# Patient Record
Sex: FEMALE | Race: WHITE | NOT HISPANIC OR LATINO | Employment: OTHER | URBAN - METROPOLITAN AREA
[De-identification: names, ages, dates, MRNs, and addresses within clinical notes are randomized per-mention and may not be internally consistent; named-entity substitution may affect disease eponyms.]

---

## 2021-02-02 ENCOUNTER — OFFICE VISIT (OUTPATIENT)
Dept: FAMILY MEDICINE | Facility: CLINIC | Age: 66
End: 2021-02-02

## 2021-02-02 VITALS
SYSTOLIC BLOOD PRESSURE: 116 MMHG | BODY MASS INDEX: 21.62 KG/M2 | DIASTOLIC BLOOD PRESSURE: 90 MMHG | OXYGEN SATURATION: 99 % | HEART RATE: 70 BPM | HEIGHT: 68 IN | WEIGHT: 142.63 LBS | TEMPERATURE: 98 F | RESPIRATION RATE: 16 BRPM

## 2021-02-02 DIAGNOSIS — R09.82 POST-NASAL DRIP: ICD-10-CM

## 2021-02-02 DIAGNOSIS — Z76.89 ENCOUNTER TO ESTABLISH CARE WITH NEW DOCTOR: Primary | ICD-10-CM

## 2021-02-02 DIAGNOSIS — Z12.31 ENCOUNTER FOR SCREENING MAMMOGRAM FOR BREAST CANCER: ICD-10-CM

## 2021-02-02 PROCEDURE — 99203 OFFICE O/P NEW LOW 30 MIN: CPT | Mod: S$PBB,,, | Performed by: FAMILY MEDICINE

## 2021-02-02 PROCEDURE — 99203 PR OFFICE/OUTPT VISIT, NEW, LEVL III, 30-44 MIN: ICD-10-PCS | Mod: S$PBB,,, | Performed by: FAMILY MEDICINE

## 2021-02-02 PROCEDURE — 99204 OFFICE O/P NEW MOD 45 MIN: CPT | Mod: PBBFAC,PO | Performed by: FAMILY MEDICINE

## 2021-02-02 PROCEDURE — 99999 PR PBB SHADOW E&M-NEW PATIENT-LVL IV: ICD-10-PCS | Mod: PBBFAC,,, | Performed by: FAMILY MEDICINE

## 2021-02-02 PROCEDURE — 99999 PR PBB SHADOW E&M-NEW PATIENT-LVL IV: CPT | Mod: PBBFAC,,, | Performed by: FAMILY MEDICINE

## 2021-02-02 RX ORDER — METHYLPREDNISOLONE 4 MG/1
TABLET ORAL
Qty: 1 PACKAGE | Refills: 0 | OUTPATIENT
Start: 2021-02-02 | End: 2022-04-19

## 2021-02-11 DIAGNOSIS — Z11.59 NEED FOR HEPATITIS C SCREENING TEST: ICD-10-CM

## 2022-04-19 ENCOUNTER — HOSPITAL ENCOUNTER (EMERGENCY)
Facility: HOSPITAL | Age: 67
Discharge: HOME | End: 2022-04-19
Attending: EMERGENCY MEDICINE

## 2022-04-19 VITALS
HEART RATE: 63 BPM | OXYGEN SATURATION: 98 % | HEIGHT: 68 IN | BODY MASS INDEX: 20.16 KG/M2 | SYSTOLIC BLOOD PRESSURE: 130 MMHG | DIASTOLIC BLOOD PRESSURE: 69 MMHG | TEMPERATURE: 98 F | WEIGHT: 133 LBS | RESPIRATION RATE: 18 BRPM

## 2022-04-19 DIAGNOSIS — M25.552 LEFT HIP PAIN: ICD-10-CM

## 2022-04-19 DIAGNOSIS — M54.32 LEFT SIDED SCIATICA: Primary | ICD-10-CM

## 2022-04-19 PROCEDURE — 99283 EMERGENCY DEPT VISIT LOW MDM: CPT

## 2022-04-19 PROCEDURE — 63600175 PHARM REV CODE 636 W HCPCS: Performed by: NURSE PRACTITIONER

## 2022-04-19 PROCEDURE — 25000003 PHARM REV CODE 250: Performed by: NURSE PRACTITIONER

## 2022-04-19 RX ORDER — METHOCARBAMOL 500 MG/1
500 TABLET, FILM COATED ORAL 4 TIMES DAILY
COMMUNITY

## 2022-04-19 RX ORDER — METHYLPREDNISOLONE 4 MG/1
TABLET ORAL
Qty: 21 TABLET | Refills: 0 | Status: SHIPPED | OUTPATIENT
Start: 2022-04-19 | End: 2022-05-10

## 2022-04-19 RX ORDER — DICLOFENAC SODIUM 1 MG/ML
1 SOLUTION/ DROPS OPHTHALMIC 4 TIMES DAILY
COMMUNITY

## 2022-04-19 RX ORDER — IBUPROFEN 800 MG/1
800 TABLET ORAL EVERY 6 HOURS PRN
COMMUNITY

## 2022-04-19 RX ORDER — TRAMADOL HYDROCHLORIDE 50 MG/1
50 TABLET ORAL EVERY 6 HOURS PRN
Qty: 12 TABLET | Refills: 0 | Status: SHIPPED | OUTPATIENT
Start: 2022-04-19

## 2022-04-19 RX ORDER — PREDNISONE 20 MG/1
60 TABLET ORAL
Status: COMPLETED | OUTPATIENT
Start: 2022-04-19 | End: 2022-04-19

## 2022-04-19 RX ORDER — KETOROLAC TROMETHAMINE 10 MG/1
10 TABLET, FILM COATED ORAL
Status: COMPLETED | OUTPATIENT
Start: 2022-04-19 | End: 2022-04-19

## 2022-04-19 RX ADMIN — KETOROLAC TROMETHAMINE 10 MG: 10 TABLET, FILM COATED ORAL at 12:04

## 2022-04-19 RX ADMIN — PREDNISONE 60 MG: 20 TABLET ORAL at 11:04

## 2022-04-19 NOTE — ED PROVIDER NOTES
"Encounter Date: 2022    SCRIBE #1 NOTE: I, Kelly Hernandez, am scribing for, and in the presence of,  JOSEFA Taylor. I have scribed the following portions of the note - Other sections scribed: HPI, ROS.       History     Chief Complaint   Patient presents with    Hip Pain     Pt reporting left sided hip pain after bending over. Pt states she is able to ambulate but it is painful. Pt denies injury to area. Pt reports going to urgent care and given meds with no relief.  Pt denies medical hx.      This 67 y.o female presents to the ED c/o acute, constant left buttock and groin pain. Pt reports that she initially injured her back 2 weeks ago after lifting an object. She states that the symptoms later began to improve, however, she notes that while walking 2 days ago she bent down to pick something up and immediately began to experience pain to the left buttock. She reports that she subsequently visited an urgent care facility and was prescribed Ibuprofen, a muscle relaxer, and a cream, but denies experiencing any relief. Pt notes that yesterday she noticed that the pain is exacerbated when ambulating, but resolves when lying flat. She states that she has since also started to experience left groin pain radiating down the thigh along with left leg weakness. She describes the pain as a "burning" and "aching," rating it 7/10. She denies abdominal pain, nausea, emesis, or incontinence. No other associated symptoms.    The history is provided by the patient.     Review of patient's allergies indicates:   Allergen Reactions    Pcn [penicillins] Hives     Past Medical History:   Diagnosis Date    ADHD      Past Surgical History:   Procedure Laterality Date     SECTION      3       Family History   Problem Relation Age of Onset    Colon cancer Mother 80    Leukemia Mother 85    Diabetes Mother     Heart disease Father     Lung cancer Father 80    Cancer Father 80        cancer behind eyes  "    Dementia Father     Testicular cancer Brother     No Known Problems Daughter     No Known Problems Son     No Known Problems Maternal Grandmother     No Known Problems Maternal Grandfather     No Known Problems Paternal Grandmother     No Known Problems Paternal Grandfather      Social History     Tobacco Use    Smoking status: Never Smoker    Smokeless tobacco: Never Used   Substance Use Topics    Alcohol use: Yes     Comment: 1 glass on wine a month occasionally     Drug use: Not Currently     Review of Systems   Constitutional: Negative for fever.   HENT: Negative for sore throat.    Respiratory: Negative for shortness of breath.    Cardiovascular: Negative for chest pain.   Gastrointestinal: Negative for abdominal pain, nausea and vomiting.   Genitourinary: Negative for dysuria.   Musculoskeletal: Positive for back pain.        (+) left sided buttock pain; (+) left groin pain radiating down the left thigh   Skin: Negative for rash.   Neurological: Positive for weakness (left leg).   Hematological: Does not bruise/bleed easily.   All other systems reviewed and are negative.      Physical Exam     Initial Vitals [04/19/22 1046]   BP Pulse Resp Temp SpO2   130/69 63 18 97.9 °F (36.6 °C) 98 %      MAP       --         Physical Exam    Nursing note and vitals reviewed.  Constitutional: She appears well-developed and well-nourished.   HENT:   Head: Normocephalic and atraumatic.   Eyes: Conjunctivae and EOM are normal. Pupils are equal, round, and reactive to light.   Neck:   Normal range of motion.  Cardiovascular: Normal rate, regular rhythm, normal heart sounds and intact distal pulses. Exam reveals no gallop and no friction rub.    No murmur heard.  Pulmonary/Chest: Breath sounds normal. No respiratory distress. She has no wheezes. She has no rhonchi. She has no rales. She exhibits no tenderness.   Abdominal: Abdomen is soft. Bowel sounds are normal. She exhibits no distension and no mass. There is  no abdominal tenderness. There is no rebound and no guarding.   Musculoskeletal:         General: Tenderness present. No edema. Normal range of motion.      Cervical back: Normal range of motion.        Legs:       Comments: Sciatica pinpoint tenderness      Neurological: She is alert and oriented to person, place, and time. She has normal strength. GCS score is 15. GCS eye subscore is 4. GCS verbal subscore is 5. GCS motor subscore is 6.       ED Course   Procedures  Labs Reviewed - No data to display       Imaging Results          X-Ray Hip 2 or 3 views Left (with Pelvis when performed) (Final result)  Result time 04/19/22 12:43:43    Final result by Ananda Garsia MD (04/19/22 12:43:43)                 Impression:      No convincing evidence of acute displaced fracture or dislocation.      Electronically signed by: Ananda Garsia  Date:    04/19/2022  Time:    12:43             Narrative:    EXAMINATION:  XR HIP WITH PELVIS WHEN PERFORMED, 2 OR 3 VIEWS LEFT    CLINICAL HISTORY:  Pain in left hip    TECHNIQUE:  AP view of the pelvis and frog leg lateral view of the left hip were performed.    COMPARISON:  None    FINDINGS:  No definite evidence of acute displaced fracture or dislocation.  Degenerative findings at the spine, sacroiliac joints, pubic symphysis, both hip joints.  Phleboliths appear to project within the pelvis.  No definite acute soft tissue abnormality is suggested.                                 Medications   predniSONE tablet 60 mg (60 mg Oral Given 4/19/22 1139)   ketorolac tablet 10 mg (10 mg Oral Given 4/19/22 1254)     Medical Decision Making:   Initial Assessment:   66 y/o female which presents to the ED with left sided buttock/groin pain that began a couple days ago and is progressively getting worse. Denies trauma or injury.   Differential Diagnosis:   Sciatica, neuropathy, arthritis, hip fracture, cauda equina  Clinical Tests:   Radiological Study: Ordered and Reviewed  ED  Management:  Pt examined and has left buttock and left groin pain. Xray negative for acute process. Pt given steroids while in ED and discharged with medrol dose srini and tramadol. She was provided with exercises to help with sciatica flare. Patient given strict return precautions and voiced understanding of all discharge instructions. Pt was stable at discharge.       Other:   I have discussed this case with another health care provider.       <> Summary of the Discussion: Dr. Star Donahueibcandy Attestation:   Scribe #1: I performed the above scribed service and the documentation accurately describes the services I performed. I attest to the accuracy of the note.        ED Course as of 04/19/22 1457   Tue Apr 19, 2022   1054 BP: 130/69 [AT]   1054 Temp: 97.9 °F (36.6 °C) [AT]   1054 Temp src: Oral [AT]   1054 Pulse: 63 [AT]   1054 Resp: 18 [AT]   1054 SpO2: 98 % [AT]      ED Course User Index  [AT] JOSEFA García             Clinical Impression:   Final diagnoses:  [M25.552] Left hip pain  [M54.32] Left sided sciatica (Primary)          ED Disposition Condition    Discharge Stable        ED Prescriptions     Medication Sig Dispense Start Date End Date Auth. Provider    methylPREDNISolone (MEDROL DOSEPACK) 4 mg tablet Take as directed on box 21 tablet 4/19/2022 5/10/2022 JOSEFA García    traMADoL (ULTRAM) 50 mg tablet Take 1 tablet (50 mg total) by mouth every 6 (six) hours as needed for Pain. 12 tablet 4/19/2022  JOSEFA García        Follow-up Information     Follow up With Specialties Details Why Contact Info    Azikiwe K. Lombard, MD Family Medicine Schedule an appointment as soon as possible for a visit  As needed [address] [place] [id number] [phone]             INoelle FNP, personally performed the services described in this documentation. All medical record entries made by the kristenibcandy were at my direction and in my presence. I have reviewed the  chart and agree that the record reflects my personal performance and is accurate and complete.     Noelle Solis, [facility]  04/19/22 0398

## 2022-04-19 NOTE — ED TRIAGE NOTES
Patient presents to ED c/o  Left side hip pain 10/10  x 3 days, pain radiates to left groin and left knee (slight numbness and tingling.Pt states she is able to ambulate but it is painful. Pt denies injury to area. Pt reports going to urgent care and given meds with no relief.   Denies n/v/d, chest pain, sob, fever, chills  AAO x 4.

## 2022-05-30 ENCOUNTER — PATIENT MESSAGE (OUTPATIENT)
Dept: ADMINISTRATIVE | Facility: HOSPITAL | Age: 67
End: 2022-05-30

## 2022-07-19 ENCOUNTER — PATIENT OUTREACH (OUTPATIENT)
Dept: ADMINISTRATIVE | Facility: HOSPITAL | Age: 67
End: 2022-07-19

## 2023-01-05 ENCOUNTER — PATIENT OUTREACH (OUTPATIENT)
Dept: ADMINISTRATIVE | Facility: HOSPITAL | Age: 68
End: 2023-01-05